# Patient Record
Sex: MALE | Race: BLACK OR AFRICAN AMERICAN | NOT HISPANIC OR LATINO
[De-identification: names, ages, dates, MRNs, and addresses within clinical notes are randomized per-mention and may not be internally consistent; named-entity substitution may affect disease eponyms.]

---

## 2024-04-11 ENCOUNTER — APPOINTMENT (OUTPATIENT)
Dept: ORTHOPEDIC SURGERY | Facility: CLINIC | Age: 44
End: 2024-04-11
Payer: OTHER MISCELLANEOUS

## 2024-04-11 VITALS — WEIGHT: 145 LBS | HEIGHT: 65 IN | BODY MASS INDEX: 24.16 KG/M2

## 2024-04-11 DIAGNOSIS — I10 ESSENTIAL (PRIMARY) HYPERTENSION: ICD-10-CM

## 2024-04-11 PROBLEM — Z00.00 ENCOUNTER FOR PREVENTIVE HEALTH EXAMINATION: Status: ACTIVE | Noted: 2024-04-11

## 2024-04-11 PROCEDURE — 99204 OFFICE O/P NEW MOD 45 MIN: CPT | Mod: 57

## 2024-04-11 PROCEDURE — 73610 X-RAY EXAM OF ANKLE: CPT | Mod: RT

## 2024-04-11 RX ORDER — AMLODIPINE BESYLATE 5 MG/1
TABLET ORAL
Refills: 0 | Status: ACTIVE | COMMUNITY

## 2024-04-11 NOTE — DATA REVIEWED
[FreeTextEntry1] : 3 views right ankle: Distal fibula fracture with significant displacement, lateral shift of talus, increased medial clear space, cortical avulsion of the medial deltoid, flake avulsion on the lateral seen suggestive of syndesmotic injury.

## 2024-04-11 NOTE — HISTORY OF PRESENT ILLNESS
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  This is a Work Related Injury being treated under Worker's Compensation. This is NOT an athletic injury occurring associated with an interscholastic or organized sports team. Quality of symptoms: sharp, soreness. 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 Patient reports there was no sign of wet floor. Patient was taken from [facility] to [facility], had x-rays done and was sent home with crutches and a soft cast, patient states nothing helps with pain relief. denies n/t and it is experiencing sharp, soreness. Patients states the pain does not radiate.

## 2024-04-11 NOTE — IMAGING
[de-identified] : RLE limited exam: Splint intact, anterior dressing taking down Skin: grossly swollen ankle foot tenderness: appropriately tender along ankle ROM: deferred secondary to fracture Able to wiggle toes, no pain with passive stretch of toes MYRA JARA SP T S S nerve distributions BCR

## 2024-04-11 NOTE — DISCUSSION/SUMMARY
[de-identified] :  Patient seen and examined. Patient presents today for evaluation of left ankle pain. Based on history, physical examination, imaging I discussed with the symptoms are related to his bimalleolar equivalent ankle fracture. We discussed that due to the fact that he has an unstable ankle fracture surgical fixation is necessary to help anatomically reduce her ankle and mitigate his risk for progressive arthrosis well improving his chance at an optimal recovery.   We discussed the risks and benefits of surgery which include but not limited to soft tissue/wound complications, bleeding, infection, neurologic injury, malunion nonunion, potential loss of life, potential need for reoperation potential need for subsequent procedures in the future.   We discussed that surgical fixation would be a combination of plates and screws to address all fractured pathology. We discussed surgical timing. Due to the fact that he is grossly swollen in his right lower extremity it is currently not safe to proceed with surgery at this time.   Preoperative informative will be given to patient which includes preop instructions regarding weightbearing status and instructions on keeping splint clean and dry as well as how to navigate the weightbearing status.   Plan to see patient back in 1 week to check on the swelling of his leg and foot. Should his swelling significantly improved plan to proceed with surgery. I discussed with patient and his father that it is not uncommon to require a week or 2 of soft tissue rest to allow for the skin to be amenable for surgery.   They understand that we are cautiously optimistic to be able to perform his surgery next week but agree that we should only proceed when it is safe to do so.   We went over the postoperative protocol which would include nonweightbearing for at minimum 4 weeks. crutch ambulating in a splint for 2 weeks followed by a boot. Sutures would plan to be removed somewhere between 2 to 4 weeks depending on how he is healing.   Modifiable risk factors that he can control is increasing vitamin D as well as having a well-balanced diet, and abstaining from any nicotine or alcohol products.   He confirms that she does not use those anywhere. All questions asked and answered. Plan for patient to follow-up in 1 week for wound check.  Utilizing shared decision making patient has elected to move forward with surgery as he wants to mitigate his risk of posttraumatic arthrosis he wants to have his fracture heal as soon as possible and wants to be able to get back to living an active lifestyle.  next visit: Order preop meds, Tall boot order, martha over post op pain protocol and recovery timeline

## 2024-04-18 ENCOUNTER — APPOINTMENT (OUTPATIENT)
Dept: ORTHOPEDIC SURGERY | Facility: CLINIC | Age: 44
End: 2024-04-18
Payer: OTHER MISCELLANEOUS

## 2024-04-18 PROCEDURE — 99214 OFFICE O/P EST MOD 30 MIN: CPT

## 2024-04-18 NOTE — HISTORY OF PRESENT ILLNESS
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  This is a Work Related Injury being treated under Worker's Compensation. This is NOT an athletic injury occurring associated with an interscholastic or organized sports team. Quality of symptoms: sharp, soreness. 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 Patient reports there was no sign of wet floor. Patient was taken from retirement to [facility], had x-rays done and was sent home with crutches and a soft cast, patient states nothing helps with pain relief. denies n/t and it is experiencing sharp, soreness. Patients states the pain does not radiate.  04/18/2024: MIK is here today to follow up on his right ankle injury. ambulating on crutches, NWB. states surgery will need to approved by [facility]. pain is bearable.

## 2024-04-18 NOTE — DISCUSSION/SUMMARY
[de-identified] :  Patient seen and examined. Patient presents today for evaluation of left ankle pain. Based on history, physical examination, imaging I discussed with the symptoms are related to his bimalleolar equivalent ankle fracture. We discussed that due to the fact that he has an unstable ankle fracture surgical fixation is necessary to help anatomically reduce her ankle and mitigate his risk for progressive arthrosis well improving his chance at an optimal recovery.   We discussed the risks and benefits of surgery which include but not limited to soft tissue/wound complications, bleeding, infection, neurologic injury, malunion nonunion, potential loss of life, potential need for reoperation potential need for subsequent procedures in the future.   We discussed that surgical fixation would be a combination of plates and screws to address all fractured pathology. We discussed surgical timing. Due to the fact that he is grossly swollen in his right lower extremity it is currently not safe to proceed with surgery at this time.   Preoperative informative will be given to patient which includes preop instructions regarding weightbearing status and instructions on keeping splint clean and dry as well as how to navigate the weightbearing status.   Plan to see patient back in 1 week to check on the swelling of his leg and foot. Should his swelling significantly improved plan to proceed with surgery. I discussed with patient and his father that it is not uncommon to require a week or 2 of soft tissue rest to allow for the skin to be amenable for surgery.   They understand that we are cautiously optimistic to be able to perform his surgery next week but agree that we should only proceed when it is safe to do so.   We went over the postoperative protocol which would include nonweightbearing for at minimum 4 weeks. crutch ambulating in a splint for 2 weeks followed by a boot. Sutures would plan to be removed somewhere between 2 to 4 weeks depending on how he is healing.   Modifiable risk factors that he can control is increasing vitamin D as well as having a well-balanced diet, and abstaining from any nicotine or alcohol products.   He confirms that she does not use those anywhere. All questions asked and answered. Plan for patient to follow-up in 1 week for wound check.  Utilizing shared decision making patient has elected to move forward with surgery as he wants to mitigate his risk of posttraumatic arthrosis he wants to have his fracture heal as soon as possible and wants to be able to get back to living an active lifestyle.  next visit: Order preop meds, Tall boot order, martha over post op pain protocol and recovery timeline **** 4/18 R ankle ORIF, possible syndesmosis repair, possible deltoid repair  Patient seen and examined. Patient presents today for evaluation of left ankle pain. Based on history, physical examination, imaging I discussed with the symptoms are related to his bimalleolar equivalent ankle fracture. We discussed that due to the fact that he has an unstable ankle fracture surgical fixation is necessary to help anatomically reduce her ankle and mitigate his risk for progressive arthrosis well improving his chance at an optimal recovery.   We discussed the risks and benefits of surgery which include but not limited to soft tissue/wound complications, bleeding, infection, neurologic injury, malunion nonunion, potential loss of life, potential need for reoperation potential need for subsequent procedures in the future.   We discussed that surgical fixation would be a combination of plates and screws to address all fractured pathology. We discussed surgical timing. Due to the fact that he is grossly swollen in his right lower extremity it is currently not safe to proceed with surgery at this time.   Preoperative informative will be given to patient which includes preop instructions regarding weightbearing status and instructions on keeping splint clean and dry as well as how to navigate the weightbearing status.   Plan to see patient back in 1 week to check on the swelling of his leg and foot. Should his swelling significantly improved plan to proceed with surgery. I discussed with patient and his father that it is not uncommon to require a week or 2 of soft tissue rest to allow for the skin to be amenable for surgery.   They understand that we are cautiously optimistic to be able to perform his surgery next week but agree that we should only proceed when it is safe to do so.   We went over the postoperative protocol which would include nonweightbearing for at minimum 4 weeks. crutch ambulating in a splint for 2 weeks followed by a boot. Sutures would plan to be removed somewhere between 2 to 4 weeks depending on how he is healing.   Modifiable risk factors that he can control is increasing vitamin D as well as having a well-balanced diet, and abstaining from any nicotine or alcohol products.   He confirms that she does not use those anywhere. All questions asked and answered. Plan for patient to follow-up in 1 week for wound check.  Utilizing shared decision making patient has elected to move forward with surgery as he wants to mitigate his risk of posttraumatic arthrosis he wants to have his fracture heal as soon as possible and wants to be able to get back to living an active lifestyle.  Pt has an operative fracture swelling amenable for surgery and should be as soon as possible with out delay from paper work or non-medical related issues.  next visit: Order preop meds, Tall boot order, martha over post op pain protocol and recovery timeline

## 2024-04-18 NOTE — IMAGING
[de-identified] : RLE limited exam: Splint intact, anterior dressing taking down Skin: mildly swollen ankle foot, improved tenderness: appropriately tender along ankle ROM: deferred secondary to fracture Able to wiggle toes, no pain with passive stretch of toes MYRA JARA SP T S S nerve distributions BCR

## 2024-04-24 ENCOUNTER — APPOINTMENT (OUTPATIENT)
Age: 44
End: 2024-04-24

## 2024-05-14 RX ORDER — ACETAMINOPHEN 500 MG/1
500 TABLET, COATED ORAL
Qty: 84 | Refills: 0 | Status: ACTIVE | COMMUNITY
Start: 2024-05-14 | End: 1900-01-01

## 2024-05-14 RX ORDER — ENOXAPARIN SODIUM 40 MG/.4ML
40 INJECTION, SOLUTION SUBCUTANEOUS
Qty: 30 | Refills: 0 | Status: ACTIVE | COMMUNITY
Start: 2024-05-14 | End: 1900-01-01

## 2024-05-14 RX ORDER — ERGOCALCIFEROL 1.25 MG/1
1.25 MG CAPSULE, LIQUID FILLED ORAL
Qty: 8 | Refills: 0 | Status: ACTIVE | COMMUNITY
Start: 2024-05-14 | End: 1900-01-01

## 2024-05-14 RX ORDER — ONDANSETRON 4 MG/1
4 TABLET ORAL 3 TIMES DAILY
Qty: 30 | Refills: 0 | Status: ACTIVE | COMMUNITY
Start: 2024-05-14 | End: 1900-01-01

## 2024-05-14 RX ORDER — DOCUSATE SODIUM 100 MG/1
100 CAPSULE ORAL TWICE DAILY
Qty: 14 | Refills: 0 | Status: ACTIVE | COMMUNITY
Start: 2024-05-14 | End: 1900-01-01

## 2024-05-14 RX ORDER — OXYCODONE 5 MG/1
5 TABLET ORAL
Qty: 42 | Refills: 0 | Status: ACTIVE | COMMUNITY
Start: 2024-05-14 | End: 1900-01-01

## 2024-05-15 ENCOUNTER — APPOINTMENT (OUTPATIENT)
Age: 44
End: 2024-05-15
Payer: OTHER MISCELLANEOUS

## 2024-05-15 PROCEDURE — 27792 TREATMENT OF ANKLE FRACTURE: CPT | Mod: 22,RT

## 2024-05-15 PROCEDURE — 27698 REPAIR OF ANKLE LIGAMENT: CPT | Mod: 59,RT

## 2024-05-15 PROCEDURE — 77071 MNL APPL STRS JT RADIOGRAPHY: CPT | Mod: RT

## 2024-05-15 PROCEDURE — 27829 TREAT LOWER LEG JOINT: CPT | Mod: 59,RT

## 2024-05-29 ENCOUNTER — TRANSCRIPTION ENCOUNTER (OUTPATIENT)
Age: 44
End: 2024-05-29

## 2024-05-29 ENCOUNTER — APPOINTMENT (OUTPATIENT)
Dept: ORTHOPEDIC SURGERY | Facility: CLINIC | Age: 44
End: 2024-05-29
Payer: OTHER MISCELLANEOUS

## 2024-05-29 PROCEDURE — 73610 X-RAY EXAM OF ANKLE: CPT | Mod: RT

## 2024-05-29 PROCEDURE — 29405 APPL SHORT LEG CAST: CPT | Mod: 58,RT

## 2024-05-29 RX ORDER — ASPIRIN 325 MG/1
325 TABLET, FILM COATED ORAL DAILY
Qty: 30 | Refills: 0 | Status: ACTIVE | COMMUNITY
Start: 2024-05-29 | End: 1900-01-01

## 2024-06-12 NOTE — IMAGING
[de-identified] : RLE limited exam: Splint intact, anterior dressing taking down Skin: mildly swollen ankle foot, improved tenderness: appropriately tender along ankle ROM: deferred secondary to fracture Able to wiggle toes, no pain with passive stretch of toes MYRA JARA SP T S S nerve distributions BCR

## 2024-06-12 NOTE — HISTORY OF PRESENT ILLNESS
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  This is a Work Related Injury being treated under Worker's Compensation. This is NOT an athletic injury occurring associated with an interscholastic or organized sports team. Quality of symptoms: sharp, soreness. 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 Patient reports there was no sign of wet floor. Patient was taken from detention to [facility], had x-rays done and was sent home with crutches and a soft cast, patient states nothing helps with pain relief. denies n/t and it is experiencing sharp, soreness. Patients states the pain does not radiate.  04/18/2024: MIK is here today to follow up on his right ankle injury. ambulating on crutches, NWB. states surgery will need to approved by [facility]. pain is bearable.   05/29/2024: Patient is here for PO#1 right ankle ORIF. Patient has been NWB with crutches and in soft cast, notes pain is improving after taking meds. Patient has been elevating which helps. compliant with splint care. compliant with lovenox. denies calf pain, chest pain fevers chills or sob. here for routine follow up.

## 2024-06-12 NOTE — PHYSICAL EXAM
[de-identified] : limited exam: splint removed, incision sites: healing well, no signs of discharge or drainage, appropriately tender, lateral incision 7 cm, medial incision 3 cm TTP: appropriately ttp along fracture site ROM: limited secondary to immobilization SILT: DP SP T S S BCR able to wiggle toes

## 2024-06-12 NOTE — DATA REVIEWED
[FreeTextEntry1] : 3 views right ankle: Distal fibula fracture with significant displacement, lateral shift of talus, increased medial clear space, cortical avulsion of the medial deltoid, flake avulsion on the lateral seen suggestive of syndesmotic injury.  3 v R ankle  s/p ORIF distal fibula, syndesmosis repair, hardware in acceptable position. no evidence of loosening or backing out.  HO observed in syndesmosis. medial gutter ossicle noted.

## 2024-06-12 NOTE — DISCUSSION/SUMMARY
[de-identified] :  Patient seen and examined. Patient presents today for evaluation of left ankle pain. Based on history, physical examination, imaging I discussed with the symptoms are related to his bimalleolar equivalent ankle fracture. We discussed that due to the fact that he has an unstable ankle fracture surgical fixation is necessary to help anatomically reduce her ankle and mitigate his risk for progressive arthrosis well improving his chance at an optimal recovery.   We discussed the risks and benefits of surgery which include but not limited to soft tissue/wound complications, bleeding, infection, neurologic injury, malunion nonunion, potential loss of life, potential need for reoperation potential need for subsequent procedures in the future.   We discussed that surgical fixation would be a combination of plates and screws to address all fractured pathology. We discussed surgical timing. Due to the fact that he is grossly swollen in his right lower extremity it is currently not safe to proceed with surgery at this time.   Preoperative informative will be given to patient which includes preop instructions regarding weightbearing status and instructions on keeping splint clean and dry as well as how to navigate the weightbearing status.   Plan to see patient back in 1 week to check on the swelling of his leg and foot. Should his swelling significantly improved plan to proceed with surgery. I discussed with patient and his father that it is not uncommon to require a week or 2 of soft tissue rest to allow for the skin to be amenable for surgery.   They understand that we are cautiously optimistic to be able to perform his surgery next week but agree that we should only proceed when it is safe to do so.   We went over the postoperative protocol which would include nonweightbearing for at minimum 4 weeks. crutch ambulating in a splint for 2 weeks followed by a boot. Sutures would plan to be removed somewhere between 2 to 4 weeks depending on how he is healing.   Modifiable risk factors that he can control is increasing vitamin D as well as having a well-balanced diet, and abstaining from any nicotine or alcohol products.   He confirms that she does not use those anywhere. All questions asked and answered. Plan for patient to follow-up in 1 week for wound check.  Utilizing shared decision making patient has elected to move forward with surgery as he wants to mitigate his risk of posttraumatic arthrosis he wants to have his fracture heal as soon as possible and wants to be able to get back to living an active lifestyle.  next visit: Order preop meds, Tall boot order, martha over post op pain protocol and recovery timeline **** 4/18 R ankle ORIF, possible syndesmosis repair, possible deltoid repair  Patient seen and examined. Patient presents today for evaluation of left ankle pain. Based on history, physical examination, imaging I discussed with the symptoms are related to his bimalleolar equivalent ankle fracture. We discussed that due to the fact that he has an unstable ankle fracture surgical fixation is necessary to help anatomically reduce her ankle and mitigate his risk for progressive arthrosis well improving his chance at an optimal recovery.   We discussed the risks and benefits of surgery which include but not limited to soft tissue/wound complications, bleeding, infection, neurologic injury, malunion nonunion, potential loss of life, potential need for reoperation potential need for subsequent procedures in the future.   We discussed that surgical fixation would be a combination of plates and screws to address all fractured pathology. We discussed surgical timing. Due to the fact that he is grossly swollen in his right lower extremity it is currently not safe to proceed with surgery at this time.   Preoperative informative will be given to patient which includes preop instructions regarding weightbearing status and instructions on keeping splint clean and dry as well as how to navigate the weightbearing status.   Plan to see patient back in 1 week to check on the swelling of his leg and foot. Should his swelling significantly improved plan to proceed with surgery. I discussed with patient and his father that it is not uncommon to require a week or 2 of soft tissue rest to allow for the skin to be amenable for surgery.   They understand that we are cautiously optimistic to be able to perform his surgery next week but agree that we should only proceed when it is safe to do so.   We went over the postoperative protocol which would include nonweightbearing for at minimum 4 weeks. crutch ambulating in a splint for 2 weeks followed by a boot. Sutures would plan to be removed somewhere between 2 to 4 weeks depending on how he is healing.   Modifiable risk factors that he can control is increasing vitamin D as well as having a well-balanced diet, and abstaining from any nicotine or alcohol products.   He confirms that she does not use those anywhere. All questions asked and answered. Plan for patient to follow-up in 1 week for wound check.  Utilizing shared decision making patient has elected to move forward with surgery as he wants to mitigate his risk of posttraumatic arthrosis he wants to have his fracture heal as soon as possible and wants to be able to get back to living an active lifestyle.  Pt has an operative fracture swelling amenable for surgery and should be as soon as possible with out delay from paper work or non-medical related issues.  next visit: Order preop meds, Tall boot order, martha over post op pain protocol and recovery timeline *** POV 1 , POD 14 s/p ORIF distal fibula, syndesmosis repair, deltoid repair splint removed, sutures removed, short leg cast applied hardware in acceptable position continue nwb cast care lovenox 40 mg qD for 2 more weeks, transition to ASA 325mg daily at 4 weeks RTC 4 weeks d/w pt that due to the delay in authorization extensive releases were needed to obtain fracture length, additionally, significant HO was observed in syndesmosis due to chronicity of injury to time to surgery.   next visit: repeat nwb R ankle xrays cast removal [place] ([facility]) give PT rx, Crutch wean, boot ween protocol

## 2024-06-26 ENCOUNTER — APPOINTMENT (OUTPATIENT)
Dept: ORTHOPEDIC SURGERY | Facility: CLINIC | Age: 44
End: 2024-06-26
Payer: COMMERCIAL

## 2024-06-26 DIAGNOSIS — S93.421A SPRAIN OF DELTOID LIGAMENT OF RIGHT ANKLE, INITIAL ENCOUNTER: ICD-10-CM

## 2024-06-26 DIAGNOSIS — S82.841A DISPLACED BIMALLEOLAR FRACTURE OF RIGHT LOWER LEG, INITIAL ENCOUNTER FOR CLOSED FRACTURE: ICD-10-CM

## 2024-06-26 PROCEDURE — 73610 X-RAY EXAM OF ANKLE: CPT | Mod: RT

## 2024-06-26 PROCEDURE — L4361: CPT | Mod: RT

## 2024-06-26 PROCEDURE — 99024 POSTOP FOLLOW-UP VISIT: CPT

## 2024-07-10 ENCOUNTER — APPOINTMENT (OUTPATIENT)
Dept: ORTHOPEDIC SURGERY | Facility: CLINIC | Age: 44
End: 2024-07-10
Payer: OTHER MISCELLANEOUS

## 2024-07-10 DIAGNOSIS — S82.841A DISPLACED BIMALLEOLAR FRACTURE OF RIGHT LOWER LEG, INITIAL ENCOUNTER FOR CLOSED FRACTURE: ICD-10-CM

## 2024-07-10 DIAGNOSIS — S93.421A SPRAIN OF DELTOID LIGAMENT OF RIGHT ANKLE, INITIAL ENCOUNTER: ICD-10-CM

## 2024-07-10 PROCEDURE — 73610 X-RAY EXAM OF ANKLE: CPT | Mod: RT

## 2024-07-10 PROCEDURE — 99024 POSTOP FOLLOW-UP VISIT: CPT

## 2024-08-07 ENCOUNTER — APPOINTMENT (OUTPATIENT)
Dept: ORTHOPEDIC SURGERY | Facility: CLINIC | Age: 44
End: 2024-08-07

## 2024-08-07 PROCEDURE — 99024 POSTOP FOLLOW-UP VISIT: CPT

## 2024-08-07 NOTE — DATA REVIEWED
[FreeTextEntry1] : 3 views right ankle: Distal fibula fracture with significant displacement, lateral shift of talus, increased medial clear space, cortical avulsion of the medial deltoid, flake avulsion on the lateral seen suggestive of syndesmotic injury.  3 v R ankle  s/p ORIF distal fibula, syndesmosis repair, hardware in acceptable position. no evidence of loosening or backing out.  HO observed in syndesmosis. medial gutter ossicle noted.    3 v R ankle 6/26 s/p ORIF distal fibula, syndesmosis repair, hardware in acceptable position. no evidence of loosening or backing out.  HO observed in syndesmosis. medial gutter ossicle noted.    3 v R ankle 7/10 s/p ORIF distal fibula, syndesmosis repair, hardware in acceptable position. no evidence of loosening or backing out.  HO observed in syndesmosis. medial gutter ossicle noted.

## 2024-08-07 NOTE — HISTORY OF PRESENT ILLNESS
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  This is a Work Related Injury being treated under Worker's Compensation. This is NOT an athletic injury occurring associated with an interscholastic or organized sports team. Quality of symptoms: sharp, soreness. 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 Patient reports there was no sign of wet floor. Patient was taken from shelter to [facility], had x-rays done and was sent home with crutches and a soft cast, patient states nothing helps with pain relief. denies n/t and it is experiencing sharp, soreness. Patients states the pain does not radiate.  04/18/2024: MIK is here today to follow up on his right ankle injury. ambulating on crutches, NWB. states surgery will need to approved by [facility]. pain is bearable.   05/29/2024: Patient is here for PO#1 right ankle ORIF. Patient has been NWB with crutches and in soft cast, notes pain is improving after taking meds. Patient has been elevating which helps. compliant with splint care. compliant with lovenox. denies calf pain, chest pain fevers chills or sob. here for routine follow up.    06/26/2024 :MIK KIM , a 44 year old male, presents today for PO#2;  Right Ankle ORIF DOI: 04/03/2024 patient is presently NWB on crutches pain only when he has too much motion and is presently in a cast. Denies chest pain or sob. Denies trauma injury accident.    PO#3  Right Ankle DOI: 04/03/2024 patient continues use of camboot and aspirin, feels much better and is now weight bearing. Denies trauma injury accident. Denies fevers/chills/night sweats. Feels like the boot is rubbing on inside on ankle despite using an ACE bandage to protect it.   08/07/2024: Patient has been using boot a lot less, has been using ASO brace when walking. Patient has continued PT and overall feels better. Doing HEP. Patient reports he does have swelling after persistent time on feet at which time he does ice, no pain. Has not needed to take anything for pain.

## 2024-08-07 NOTE — PHYSICAL EXAM
[de-identified] : limited exam:  ASO in place  incision sites: healing well, no signs of discharge or drainage, appropriately tender, lateral incision 7 cm, medial incision 3 cm <1mm red papule, not draining TTP: no ttp along fracture site ROM 0-30 DF/PF, Inv/ev 10/10 SILT: DP SP T S S BCR able to wiggle toes

## 2024-08-07 NOTE — DISCUSSION/SUMMARY
[de-identified] :  Patient seen and examined. Patient presents today for evaluation of left ankle pain. Based on history, physical examination, imaging I discussed with the symptoms are related to his bimalleolar equivalent ankle fracture. We discussed that due to the fact that he has an unstable ankle fracture surgical fixation is necessary to help anatomically reduce her ankle and mitigate his risk for progressive arthrosis well improving his chance at an optimal recovery.   We discussed the risks and benefits of surgery which include but not limited to soft tissue/wound complications, bleeding, infection, neurologic injury, malunion nonunion, potential loss of life, potential need for reoperation potential need for subsequent procedures in the future.   We discussed that surgical fixation would be a combination of plates and screws to address all fractured pathology. We discussed surgical timing. Due to the fact that he is grossly swollen in his right lower extremity it is currently not safe to proceed with surgery at this time.   Preoperative informative will be given to patient which includes preop instructions regarding weightbearing status and instructions on keeping splint clean and dry as well as how to navigate the weightbearing status.   Plan to see patient back in 1 week to check on the swelling of his leg and foot. Should his swelling significantly improved plan to proceed with surgery. I discussed with patient and his father that it is not uncommon to require a week or 2 of soft tissue rest to allow for the skin to be amenable for surgery.   They understand that we are cautiously optimistic to be able to perform his surgery next week but agree that we should only proceed when it is safe to do so.   We went over the postoperative protocol which would include nonweightbearing for at minimum 4 weeks. crutch ambulating in a splint for 2 weeks followed by a boot. Sutures would plan to be removed somewhere between 2 to 4 weeks depending on how he is healing.   Modifiable risk factors that he can control is increasing vitamin D as well as having a well-balanced diet, and abstaining from any nicotine or alcohol products.   He confirms that she does not use those anywhere. All questions asked and answered. Plan for patient to follow-up in 1 week for wound check.  Utilizing shared decision making patient has elected to move forward with surgery as he wants to mitigate his risk of posttraumatic arthrosis he wants to have his fracture heal as soon as possible and wants to be able to get back to living an active lifestyle.  next visit: Order preop meds, Tall boot order, martha over post op pain protocol and recovery timeline **** 4/18 R ankle ORIF, possible syndesmosis repair, possible deltoid repair  Patient seen and examined. Patient presents today for evaluation of left ankle pain. Based on history, physical examination, imaging I discussed with the symptoms are related to his bimalleolar equivalent ankle fracture. We discussed that due to the fact that he has an unstable ankle fracture surgical fixation is necessary to help anatomically reduce her ankle and mitigate his risk for progressive arthrosis well improving his chance at an optimal recovery.   We discussed the risks and benefits of surgery which include but not limited to soft tissue/wound complications, bleeding, infection, neurologic injury, malunion nonunion, potential loss of life, potential need for reoperation potential need for subsequent procedures in the future.   We discussed that surgical fixation would be a combination of plates and screws to address all fractured pathology. We discussed surgical timing. Due to the fact that he is grossly swollen in his right lower extremity it is currently not safe to proceed with surgery at this time.   Preoperative informative will be given to patient which includes preop instructions regarding weightbearing status and instructions on keeping splint clean and dry as well as how to navigate the weightbearing status.   Plan to see patient back in 1 week to check on the swelling of his leg and foot. Should his swelling significantly improved plan to proceed with surgery. I discussed with patient and his father that it is not uncommon to require a week or 2 of soft tissue rest to allow for the skin to be amenable for surgery.   They understand that we are cautiously optimistic to be able to perform his surgery next week but agree that we should only proceed when it is safe to do so.   We went over the postoperative protocol which would include nonweightbearing for at minimum 4 weeks. crutch ambulating in a splint for 2 weeks followed by a boot. Sutures would plan to be removed somewhere between 2 to 4 weeks depending on how he is healing.   Modifiable risk factors that he can control is increasing vitamin D as well as having a well-balanced diet, and abstaining from any nicotine or alcohol products.   He confirms that she does not use those anywhere. All questions asked and answered. Plan for patient to follow-up in 1 week for wound check.  Utilizing shared decision making patient has elected to move forward with surgery as he wants to mitigate his risk of posttraumatic arthrosis he wants to have his fracture heal as soon as possible and wants to be able to get back to living an active lifestyle.  Pt has an operative fracture swelling amenable for surgery and should be as soon as possible with out delay from paper work or non-medical related issues.  next visit: Order preop meds, Tall boot order, martha over post op pain protocol and recovery timeline *** POV 1 , POD 14 s/p ORIF distal fibula, syndesmosis repair, deltoid repair splint removed, sutures removed, short leg cast applied hardware in acceptable position continue nwb cast care lovenox 40 mg qD for 2 more weeks, transition to ASA 325mg daily at 4 weeks RTC 4 weeks d/w pt that due to the delay in authorization extensive releases were needed to obtain fracture length, additionally, significant HO was observed in syndesmosis due to chronicity of injury to time to surgery.   next visit: repeat nwb R ankle xrays cast removal Camboot ([facility]) give PT rx, Crutch wean, boot ween protocol  *** 6/26 POV 2 , 6 weeks s/p ORIF distal fibula, syndesmosis repair, deltoid repair cast removed, Camboot (tall applied) Start crutch ween hardware in acceptable positions RTC 2 weeks d/w pt that due to the delay in authorization extensive releases were needed to obtain fracture length, additionally, significant HO was observed in syndesmosis due to chronicity of injury to time to surgery. Educated pt that his post op xrays are not required to be given to his employer especially in the setting of his employer prolonging surgery and pushing surgery date 6 weeks out from injury necessitating a more complicated surgery due to the degree of healing / scar tissue that was present by that time.   next visit: Order PT and start Boot ween, given post op protocol and ASO ([facility]) ******* 7/10 POV 3 , 8 weeks s/p ORIF distal fibula, syndesmosis repair, deltoid repair Tall Camboot Start boot ween  hardware in acceptable positions RTC 4 weeks d/w pt that due to the delay in authorization extensive releases were needed to obtain fracture length, additionally, significant HO was observed in syndesmosis due to chronicity of injury to time to surgery. Educated pt that his post op xrays are not required to be given to his employer especially in the setting of his employer prolonging surgery and pushing surgery date 6 weeks out from injury necessitating a more complicated surgery due to the degree of healing / scar tissue that was present by that time.   next visit: Continue PT discuss impact exercises  ***  Continue PT - script renewal ordered Patient still OOW RTC in 4 weeks   next visit: R ankle 3 v wb xr no impact exercises 4.5 months

## 2024-09-03 ENCOUNTER — APPOINTMENT (OUTPATIENT)
Dept: ORTHOPEDIC SURGERY | Facility: CLINIC | Age: 44
End: 2024-09-03
Payer: OTHER MISCELLANEOUS

## 2024-09-03 DIAGNOSIS — S82.841A DISPLACED BIMALLEOLAR FRACTURE OF RIGHT LOWER LEG, INITIAL ENCOUNTER FOR CLOSED FRACTURE: ICD-10-CM

## 2024-09-03 DIAGNOSIS — S93.421A SPRAIN OF DELTOID LIGAMENT OF RIGHT ANKLE, INITIAL ENCOUNTER: ICD-10-CM

## 2024-09-03 PROCEDURE — 99213 OFFICE O/P EST LOW 20 MIN: CPT

## 2024-09-03 PROCEDURE — 73610 X-RAY EXAM OF ANKLE: CPT | Mod: RT

## 2024-09-03 NOTE — PHYSICAL EXAM
[de-identified] : limited exam:  ASO in place  incision sites: healing well, no signs of discharge or drainage, appropriately tender, lateral incision 7 cm, medial incision 3 cm well healed TTP: no ttp along fracture site ROM 0-30 DF/PF, Inv/ev 20/15 MOTOR: 5/5 DF/PF, Inv/Ev 4/5 SILT: DP SP T S S BCR able to wiggle toes

## 2024-09-03 NOTE — DISCUSSION/SUMMARY
[de-identified] :  Patient seen and examined. Patient presents today for evaluation of left ankle pain. Based on history, physical examination, imaging I discussed with the symptoms are related to his bimalleolar equivalent ankle fracture. We discussed that due to the fact that he has an unstable ankle fracture surgical fixation is necessary to help anatomically reduce her ankle and mitigate his risk for progressive arthrosis well improving his chance at an optimal recovery.   We discussed the risks and benefits of surgery which include but not limited to soft tissue/wound complications, bleeding, infection, neurologic injury, malunion nonunion, potential loss of life, potential need for reoperation potential need for subsequent procedures in the future.   We discussed that surgical fixation would be a combination of plates and screws to address all fractured pathology. We discussed surgical timing. Due to the fact that he is grossly swollen in his right lower extremity it is currently not safe to proceed with surgery at this time.   Preoperative informative will be given to patient which includes preop instructions regarding weightbearing status and instructions on keeping splint clean and dry as well as how to navigate the weightbearing status.   Plan to see patient back in 1 week to check on the swelling of his leg and foot. Should his swelling significantly improved plan to proceed with surgery. I discussed with patient and his father that it is not uncommon to require a week or 2 of soft tissue rest to allow for the skin to be amenable for surgery.   They understand that we are cautiously optimistic to be able to perform his surgery next week but agree that we should only proceed when it is safe to do so.   We went over the postoperative protocol which would include nonweightbearing for at minimum 4 weeks. crutch ambulating in a splint for 2 weeks followed by a boot. Sutures would plan to be removed somewhere between 2 to 4 weeks depending on how he is healing.   Modifiable risk factors that he can control is increasing vitamin D as well as having a well-balanced diet, and abstaining from any nicotine or alcohol products.   He confirms that she does not use those anywhere. All questions asked and answered. Plan for patient to follow-up in 1 week for wound check.  Utilizing shared decision making patient has elected to move forward with surgery as he wants to mitigate his risk of posttraumatic arthrosis he wants to have his fracture heal as soon as possible and wants to be able to get back to living an active lifestyle.  next visit: Order preop meds, Tall boot order, martha over post op pain protocol and recovery timeline **** 4/18 R ankle ORIF, possible syndesmosis repair, possible deltoid repair  Patient seen and examined. Patient presents today for evaluation of left ankle pain. Based on history, physical examination, imaging I discussed with the symptoms are related to his bimalleolar equivalent ankle fracture. We discussed that due to the fact that he has an unstable ankle fracture surgical fixation is necessary to help anatomically reduce her ankle and mitigate his risk for progressive arthrosis well improving his chance at an optimal recovery.   We discussed the risks and benefits of surgery which include but not limited to soft tissue/wound complications, bleeding, infection, neurologic injury, malunion nonunion, potential loss of life, potential need for reoperation potential need for subsequent procedures in the future.   We discussed that surgical fixation would be a combination of plates and screws to address all fractured pathology. We discussed surgical timing. Due to the fact that he is grossly swollen in his right lower extremity it is currently not safe to proceed with surgery at this time.   Preoperative informative will be given to patient which includes preop instructions regarding weightbearing status and instructions on keeping splint clean and dry as well as how to navigate the weightbearing status.   Plan to see patient back in 1 week to check on the swelling of his leg and foot. Should his swelling significantly improved plan to proceed with surgery. I discussed with patient and his father that it is not uncommon to require a week or 2 of soft tissue rest to allow for the skin to be amenable for surgery.   They understand that we are cautiously optimistic to be able to perform his surgery next week but agree that we should only proceed when it is safe to do so.   We went over the postoperative protocol which would include nonweightbearing for at minimum 4 weeks. crutch ambulating in a splint for 2 weeks followed by a boot. Sutures would plan to be removed somewhere between 2 to 4 weeks depending on how he is healing.   Modifiable risk factors that he can control is increasing vitamin D as well as having a well-balanced diet, and abstaining from any nicotine or alcohol products.   He confirms that she does not use those anywhere. All questions asked and answered. Plan for patient to follow-up in 1 week for wound check.  Utilizing shared decision making patient has elected to move forward with surgery as he wants to mitigate his risk of posttraumatic arthrosis he wants to have his fracture heal as soon as possible and wants to be able to get back to living an active lifestyle.  Pt has an operative fracture swelling amenable for surgery and should be as soon as possible with out delay from paper work or non-medical related issues.  next visit: Order preop meds, Tall boot order, martha over post op pain protocol and recovery timeline *** POV 1 , POD 14 s/p ORIF distal fibula, syndesmosis repair, deltoid repair splint removed, sutures removed, short leg cast applied hardware in acceptable position continue nwb cast care lovenox 40 mg qD for 2 more weeks, transition to ASA 325mg daily at 4 weeks RTC 4 weeks d/w pt that due to the delay in authorization extensive releases were needed to obtain fracture length, additionally, significant HO was observed in syndesmosis due to chronicity of injury to time to surgery.   next visit: repeat nwb R ankle xrays cast removal Camboot ([facility]) give PT rx, Crutch wean, boot ween protocol  *** 6/26 POV 2 , 6 weeks s/p ORIF distal fibula, syndesmosis repair, deltoid repair cast removed, Camboot (tall applied) Start crutch ween hardware in acceptable positions RTC 2 weeks d/w pt that due to the delay in authorization extensive releases were needed to obtain fracture length, additionally, significant HO was observed in syndesmosis due to chronicity of injury to time to surgery. Educated pt that his post op xrays are not required to be given to his employer especially in the setting of his employer prolonging surgery and pushing surgery date 6 weeks out from injury necessitating a more complicated surgery due to the degree of healing / scar tissue that was present by that time.   next visit: Order PT and start Boot ween, given post op protocol and ASO ([facility]) ******* 7/10 POV 3 , 8 weeks s/p ORIF distal fibula, syndesmosis repair, deltoid repair Tall Camboot Start boot ween  hardware in acceptable positions RTC 4 weeks d/w pt that due to the delay in authorization extensive releases were needed to obtain fracture length, additionally, significant HO was observed in syndesmosis due to chronicity of injury to time to surgery. Educated pt that his post op xrays are not required to be given to his employer especially in the setting of his employer prolonging surgery and pushing surgery date 6 weeks out from injury necessitating a more complicated surgery due to the degree of healing / scar tissue that was present by that time.   next visit: Continue PT discuss impact exercises  ***  89/3 Continue PT - script renewal ordered Patient still OOW RTC in 4 weeks  Continue ASO for another 1.5 months all the time and 6 months in total with activities Continue Strengthening  next visit: Pt will be 4.5 months next visit no impact exercises 4.5 months Discuss rts / RTW full duty Pt says light duty not an option

## 2024-09-03 NOTE — HISTORY OF PRESENT ILLNESS
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  This is a Work Related Injury being treated under Worker's Compensation. This is NOT an athletic injury occurring associated with an interscholastic or organized sports team. Quality of symptoms: sharp, soreness. 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 Patient reports there was no sign of wet floor. Patient was taken from care home to [facility], had x-rays done and was sent home with crutches and a soft cast, patient states nothing helps with pain relief. denies n/t and it is experiencing sharp, soreness. Patients states the pain does not radiate.  04/18/2024: MIK is here today to follow up on his right ankle injury. ambulating on crutches, NWB. states surgery will need to approved by [facility]. pain is bearable.   05/29/2024: Patient is here for PO#1 right ankle ORIF. Patient has been NWB with crutches and in soft cast, notes pain is improving after taking meds. Patient has been elevating which helps. compliant with splint care. compliant with lovenox. denies calf pain, chest pain fevers chills or sob. here for routine follow up.    06/26/2024 :MIK KIM , a 44 year old male, presents today for PO#2;  Right Ankle ORIF DOI: 04/03/2024 patient is presently NWB on crutches pain only when he has too much motion and is presently in a cast. Denies chest pain or sob. Denies trauma injury accident.    PO#3  Right Ankle DOI: 04/03/2024 patient continues use of camboot and aspirin, feels much better and is now weight bearing. Denies trauma injury accident. Denies fevers/chills/night sweats. Feels like the boot is rubbing on inside on ankle despite using an ACE bandage to protect it.   08/07/2024: Patient has been using boot a lot less, has been using ASO brace when walking. Patient has continued PT and overall feels better. Doing HEP. Patient reports he does have swelling after persistent time on feet at which time he does ice, no pain. Has not needed to take anything for pain.   09/03/2024 MIK 44 year M  is here today to follow up on right ankle. Patient reports some mild improvement since last visit. Still c/o mild swelling and discomfort with prolonged walking, otherwise doing well. Patient is doing PT. He wears his ASO brace when prolonged walking. Patient had been taken vitamins D and aspirins.

## 2024-09-03 NOTE — DATA REVIEWED
[FreeTextEntry1] : 3 views right ankle: Distal fibula fracture with significant displacement, lateral shift of talus, increased medial clear space, cortical avulsion of the medial deltoid, flake avulsion on the lateral seen suggestive of syndesmotic injury.  3 v R ankle  s/p ORIF distal fibula, syndesmosis repair, hardware in acceptable position. no evidence of loosening or backing out.  HO observed in syndesmosis. medial gutter ossicle noted.    3 v R ankle 6/26 s/p ORIF distal fibula, syndesmosis repair, hardware in acceptable position. no evidence of loosening or backing out.  HO observed in syndesmosis. medial gutter ossicle noted.    3 v R ankle 7/10 s/p ORIF distal fibula, syndesmosis repair, hardware in acceptable position. no evidence of loosening or backing out.  HO observed in syndesmosis. medial gutter ossicle noted.   3 v R ankle 9/3 s/p ORIF distal fibula, syndesmosis repair, hardware in acceptable position. no evidence of loosening or backing out. Fracture appears healed. interval change HO observed in syndesmosis. medial gutter ossicle noted.

## 2024-10-01 ENCOUNTER — APPOINTMENT (OUTPATIENT)
Dept: ORTHOPEDIC SURGERY | Facility: CLINIC | Age: 44
End: 2024-10-01
Payer: OTHER MISCELLANEOUS

## 2024-10-01 DIAGNOSIS — S93.421A SPRAIN OF DELTOID LIGAMENT OF RIGHT ANKLE, INITIAL ENCOUNTER: ICD-10-CM

## 2024-10-01 DIAGNOSIS — S82.841A DISPLACED BIMALLEOLAR FRACTURE OF RIGHT LOWER LEG, INITIAL ENCOUNTER FOR CLOSED FRACTURE: ICD-10-CM

## 2024-10-01 PROCEDURE — 99213 OFFICE O/P EST LOW 20 MIN: CPT

## 2024-10-01 NOTE — PHYSICAL EXAM
[de-identified] : limited exam:  ASO in place  incision sites:  well healed, no signs of discharge or drainage, lateral incision 7 cm, medial incision 3 cm well healed TTP: no ttp along fracture site ROM -5-30 DF/PF, Inv/ev 20/15 MOTOR: 5/5 DF/PF, Inv/Ev 4/5 SILT: DP SP T S S BCR able to wiggle toes

## 2024-10-01 NOTE — HISTORY OF PRESENT ILLNESS
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  This is a Work Related Injury being treated under Worker's Compensation. This is NOT an athletic injury occurring associated with an interscholastic or organized sports team. Quality of symptoms: sharp, soreness. 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 Patient reports there was no sign of wet floor. Patient was taken from prison to [facility], had x-rays done and was sent home with crutches and a soft cast, patient states nothing helps with pain relief. denies n/t and it is experiencing sharp, soreness. Patients states the pain does not radiate.  04/18/2024: MIK is here today to follow up on his right ankle injury. ambulating on crutches, NWB. states surgery will need to approved by [facility]. pain is bearable.   05/29/2024: Patient is here for PO#1 right ankle ORIF. Patient has been NWB with crutches and in soft cast, notes pain is improving after taking meds. Patient has been elevating which helps. compliant with splint care. compliant with lovenox. denies calf pain, chest pain fevers chills or sob. here for routine follow up.    06/26/2024 :MIK KIM , a 44 year old male, presents today for PO#2;  Right Ankle ORIF DOI: 04/03/2024 patient is presently NWB on crutches pain only when he has too much motion and is presently in a cast. Denies chest pain or sob. Denies trauma injury accident.    PO#3  Right Ankle DOI: 04/03/2024 patient continues use of camboot and aspirin, feels much better and is now weight bearing. Denies trauma injury accident. Denies fevers/chills/night sweats. Feels like the boot is rubbing on inside on ankle despite using an ACE bandage to protect it.   08/07/2024: Patient has been using boot a lot less, has been using ASO brace when walking. Patient has continued PT and overall feels better. Doing HEP. Patient reports he does have swelling after persistent time on feet at which time he does ice, no pain. Has not needed to take anything for pain.   09/03/2024 MIK 44 year M  is here today to follow up on right ankle. Patient reports some mild improvement since last visit. Still c/o mild swelling and discomfort with prolonged walking, otherwise doing well. Patient is doing PT. He wears his ASO brace when prolonged walking. Patient had been taken vitamins D and aspirins.  10/01/2024 MIK  is here today to follow up on right ankle. Patient had been complaint with ASO. He takes Tylenol as needed. Patient is doing PT. Reports some mild improvement with pain since last visit, reports some swelling and tightness pain after walking.

## 2024-10-01 NOTE — DISCUSSION/SUMMARY
[de-identified] :  Patient seen and examined. Patient presents today for evaluation of left ankle pain. Based on history, physical examination, imaging I discussed with the symptoms are related to his bimalleolar equivalent ankle fracture. We discussed that due to the fact that he has an unstable ankle fracture surgical fixation is necessary to help anatomically reduce her ankle and mitigate his risk for progressive arthrosis well improving his chance at an optimal recovery.   We discussed the risks and benefits of surgery which include but not limited to soft tissue/wound complications, bleeding, infection, neurologic injury, malunion nonunion, potential loss of life, potential need for reoperation potential need for subsequent procedures in the future.   We discussed that surgical fixation would be a combination of plates and screws to address all fractured pathology. We discussed surgical timing. Due to the fact that he is grossly swollen in his right lower extremity it is currently not safe to proceed with surgery at this time.   Preoperative informative will be given to patient which includes preop instructions regarding weightbearing status and instructions on keeping splint clean and dry as well as how to navigate the weightbearing status.   Plan to see patient back in 1 week to check on the swelling of his leg and foot. Should his swelling significantly improved plan to proceed with surgery. I discussed with patient and his father that it is not uncommon to require a week or 2 of soft tissue rest to allow for the skin to be amenable for surgery.   They understand that we are cautiously optimistic to be able to perform his surgery next week but agree that we should only proceed when it is safe to do so.   We went over the postoperative protocol which would include nonweightbearing for at minimum 4 weeks. crutch ambulating in a splint for 2 weeks followed by a boot. Sutures would plan to be removed somewhere between 2 to 4 weeks depending on how he is healing.   Modifiable risk factors that he can control is increasing vitamin D as well as having a well-balanced diet, and abstaining from any nicotine or alcohol products.   He confirms that she does not use those anywhere. All questions asked and answered. Plan for patient to follow-up in 1 week for wound check.  Utilizing shared decision making patient has elected to move forward with surgery as he wants to mitigate his risk of posttraumatic arthrosis he wants to have his fracture heal as soon as possible and wants to be able to get back to living an active lifestyle.  next visit: Order preop meds, Tall boot order, martha over post op pain protocol and recovery timeline **** 4/18 R ankle ORIF, possible syndesmosis repair, possible deltoid repair  Patient seen and examined. Patient presents today for evaluation of left ankle pain. Based on history, physical examination, imaging I discussed with the symptoms are related to his bimalleolar equivalent ankle fracture. We discussed that due to the fact that he has an unstable ankle fracture surgical fixation is necessary to help anatomically reduce her ankle and mitigate his risk for progressive arthrosis well improving his chance at an optimal recovery.   We discussed the risks and benefits of surgery which include but not limited to soft tissue/wound complications, bleeding, infection, neurologic injury, malunion nonunion, potential loss of life, potential need for reoperation potential need for subsequent procedures in the future.   We discussed that surgical fixation would be a combination of plates and screws to address all fractured pathology. We discussed surgical timing. Due to the fact that he is grossly swollen in his right lower extremity it is currently not safe to proceed with surgery at this time.   Preoperative informative will be given to patient which includes preop instructions regarding weightbearing status and instructions on keeping splint clean and dry as well as how to navigate the weightbearing status.   Plan to see patient back in 1 week to check on the swelling of his leg and foot. Should his swelling significantly improved plan to proceed with surgery. I discussed with patient and his father that it is not uncommon to require a week or 2 of soft tissue rest to allow for the skin to be amenable for surgery.   They understand that we are cautiously optimistic to be able to perform his surgery next week but agree that we should only proceed when it is safe to do so.   We went over the postoperative protocol which would include nonweightbearing for at minimum 4 weeks. crutch ambulating in a splint for 2 weeks followed by a boot. Sutures would plan to be removed somewhere between 2 to 4 weeks depending on how he is healing.   Modifiable risk factors that he can control is increasing vitamin D as well as having a well-balanced diet, and abstaining from any nicotine or alcohol products.   He confirms that she does not use those anywhere. All questions asked and answered. Plan for patient to follow-up in 1 week for wound check.  Utilizing shared decision making patient has elected to move forward with surgery as he wants to mitigate his risk of posttraumatic arthrosis he wants to have his fracture heal as soon as possible and wants to be able to get back to living an active lifestyle.  Pt has an operative fracture swelling amenable for surgery and should be as soon as possible with out delay from paper work or non-medical related issues.  next visit: Order preop meds, Tall boot order, martha over post op pain protocol and recovery timeline *** POV 1 , POD 14 s/p ORIF distal fibula, syndesmosis repair, deltoid repair splint removed, sutures removed, short leg cast applied hardware in acceptable position continue nwb cast care lovenox 40 mg qD for 2 more weeks, transition to ASA 325mg daily at 4 weeks RTC 4 weeks d/w pt that due to the delay in authorization extensive releases were needed to obtain fracture length, additionally, significant HO was observed in syndesmosis due to chronicity of injury to time to surgery.   next visit: repeat nwb R ankle xrays cast removal Camboot ([facility]) give PT rx, Crutch wean, boot ween protocol  *** 6/26 POV 2 , 6 weeks s/p ORIF distal fibula, syndesmosis repair, deltoid repair cast removed, Camboot (tall applied) Start crutch ween hardware in acceptable positions RTC 2 weeks d/w pt that due to the delay in authorization extensive releases were needed to obtain fracture length, additionally, significant HO was observed in syndesmosis due to chronicity of injury to time to surgery. Educated pt that his post op xrays are not required to be given to his employer especially in the setting of his employer prolonging surgery and pushing surgery date 6 weeks out from injury necessitating a more complicated surgery due to the degree of healing / scar tissue that was present by that time.   next visit: Order PT and start Boot ween, given post op protocol and ASO (WC) ******* 7/10 POV 3 , 8 weeks s/p ORIF distal fibula, syndesmosis repair, deltoid repair Tall Camboot Start boot ween  hardware in acceptable positions RTC 4 weeks d/w pt that due to the delay in authorization extensive releases were needed to obtain fracture length, additionally, significant HO was observed in syndesmosis due to chronicity of injury to time to surgery. Educated pt that his post op xrays are not required to be given to his employer especially in the setting of his employer prolonging surgery and pushing surgery date 6 weeks out from injury necessitating a more complicated surgery due to the degree of healing / scar tissue that was present by that time.   next visit: Continue PT discuss impact exercises  ***  9/3 Continue PT - script renewal ordered Patient still OOW RTC in 4 weeks  Continue ASO for another 1.5 months all the time and 6 months in total with activities Continue Strengthening  next visit: Pt will be 4.5 months next visit no impact exercises 4.5 months Discuss rts / RTW full duty Pt says light duty not an option *** 10/1 4.5 months s/p Jorden equivalent R ankle ORIF Continue PT - script renewed, start impact exercises and returning to preinjury level of cardio light duty desk duty RTC in 6 weeks  Continue ASO for another 1.5 months all the time and 6 months in total with activities Continue Strengthening  next visit: Pt will be 6 months next visit Discuss rts / RTW full duty

## 2024-11-26 ENCOUNTER — APPOINTMENT (OUTPATIENT)
Dept: ORTHOPEDIC SURGERY | Facility: CLINIC | Age: 44
End: 2024-11-26
Payer: OTHER MISCELLANEOUS

## 2024-11-26 DIAGNOSIS — S93.421A SPRAIN OF DELTOID LIGAMENT OF RIGHT ANKLE, INITIAL ENCOUNTER: ICD-10-CM

## 2024-11-26 DIAGNOSIS — S82.841A DISPLACED BIMALLEOLAR FRACTURE OF RIGHT LOWER LEG, INITIAL ENCOUNTER FOR CLOSED FRACTURE: ICD-10-CM

## 2024-11-26 PROCEDURE — 99213 OFFICE O/P EST LOW 20 MIN: CPT

## 2024-11-26 PROCEDURE — 73610 X-RAY EXAM OF ANKLE: CPT | Mod: RT

## 2025-01-07 ENCOUNTER — APPOINTMENT (OUTPATIENT)
Dept: ORTHOPEDIC SURGERY | Facility: CLINIC | Age: 45
End: 2025-01-07
Payer: OTHER MISCELLANEOUS

## 2025-01-07 DIAGNOSIS — S82.841A DISPLACED BIMALLEOLAR FRACTURE OF RIGHT LOWER LEG, INITIAL ENCOUNTER FOR CLOSED FRACTURE: ICD-10-CM

## 2025-01-07 DIAGNOSIS — S93.421A SPRAIN OF DELTOID LIGAMENT OF RIGHT ANKLE, INITIAL ENCOUNTER: ICD-10-CM

## 2025-01-07 PROCEDURE — 99213 OFFICE O/P EST LOW 20 MIN: CPT

## 2025-03-04 ENCOUNTER — APPOINTMENT (OUTPATIENT)
Dept: ORTHOPEDIC SURGERY | Facility: CLINIC | Age: 45
End: 2025-03-04

## 2025-03-25 ENCOUNTER — APPOINTMENT (OUTPATIENT)
Dept: ORTHOPEDIC SURGERY | Facility: CLINIC | Age: 45
End: 2025-03-25
Payer: OTHER MISCELLANEOUS

## 2025-03-25 DIAGNOSIS — S82.841A DISPLACED BIMALLEOLAR FRACTURE OF RIGHT LOWER LEG, INITIAL ENCOUNTER FOR CLOSED FRACTURE: ICD-10-CM

## 2025-03-25 PROCEDURE — 99213 OFFICE O/P EST LOW 20 MIN: CPT

## 2025-05-27 ENCOUNTER — APPOINTMENT (OUTPATIENT)
Dept: ORTHOPEDIC SURGERY | Facility: CLINIC | Age: 45
End: 2025-05-27

## 2025-06-02 ENCOUNTER — APPOINTMENT (OUTPATIENT)
Dept: ORTHOPEDIC SURGERY | Facility: CLINIC | Age: 45
End: 2025-06-02

## 2025-06-23 ENCOUNTER — APPOINTMENT (OUTPATIENT)
Dept: ORTHOPEDIC SURGERY | Facility: CLINIC | Age: 45
End: 2025-06-23
Payer: OTHER MISCELLANEOUS

## 2025-06-23 PROCEDURE — 99213 OFFICE O/P EST LOW 20 MIN: CPT

## 2025-08-25 ENCOUNTER — APPOINTMENT (OUTPATIENT)
Dept: ORTHOPEDIC SURGERY | Facility: CLINIC | Age: 45
End: 2025-08-25
Payer: OTHER MISCELLANEOUS

## 2025-08-25 DIAGNOSIS — S93.421A SPRAIN OF DELTOID LIGAMENT OF RIGHT ANKLE, INITIAL ENCOUNTER: ICD-10-CM

## 2025-08-25 DIAGNOSIS — S82.841A DISPLACED BIMALLEOLAR FRACTURE OF RIGHT LOWER LEG, INITIAL ENCOUNTER FOR CLOSED FRACTURE: ICD-10-CM

## 2025-08-25 DIAGNOSIS — M89.8X9 OTHER SPECIFIED DISORDERS OF BONE, UNSPECIFIED SITE: ICD-10-CM

## 2025-08-25 PROCEDURE — 99214 OFFICE O/P EST MOD 30 MIN: CPT

## 2025-08-25 PROCEDURE — 73610 X-RAY EXAM OF ANKLE: CPT | Mod: RT

## 2025-08-25 RX ORDER — MELOXICAM 7.5 MG/1
7.5 TABLET ORAL
Qty: 60 | Refills: 0 | Status: ACTIVE | COMMUNITY
Start: 2025-08-25 | End: 1900-01-01